# Patient Record
Sex: MALE | Race: WHITE | Employment: FULL TIME | URBAN - METROPOLITAN AREA
[De-identification: names, ages, dates, MRNs, and addresses within clinical notes are randomized per-mention and may not be internally consistent; named-entity substitution may affect disease eponyms.]

---

## 2023-07-27 ENCOUNTER — APPOINTMENT (OUTPATIENT)
Dept: GENERAL RADIOLOGY | Age: 23
End: 2023-07-27
Payer: COMMERCIAL

## 2023-07-27 ENCOUNTER — OFFICE VISIT (OUTPATIENT)
Age: 23
End: 2023-07-27

## 2023-07-27 ENCOUNTER — HOSPITAL ENCOUNTER (EMERGENCY)
Age: 23
Discharge: HOME | End: 2023-07-27
Attending: EMERGENCY MEDICINE
Payer: COMMERCIAL

## 2023-07-27 VITALS
TEMPERATURE: 97.8 F | DIASTOLIC BLOOD PRESSURE: 65 MMHG | HEIGHT: 61 IN | HEART RATE: 71 BPM | BODY MASS INDEX: 24.31 KG/M2 | WEIGHT: 128.75 LBS | RESPIRATION RATE: 14 BRPM | OXYGEN SATURATION: 100 % | SYSTOLIC BLOOD PRESSURE: 139 MMHG

## 2023-07-27 VITALS
SYSTOLIC BLOOD PRESSURE: 117 MMHG | DIASTOLIC BLOOD PRESSURE: 76 MMHG | OXYGEN SATURATION: 97 % | TEMPERATURE: 98.1 F | WEIGHT: 130 LBS | HEART RATE: 78 BPM

## 2023-07-27 DIAGNOSIS — S61.213A LACERATION OF LEFT MIDDLE FINGER WITHOUT FOREIGN BODY WITHOUT DAMAGE TO NAIL, INITIAL ENCOUNTER: ICD-10-CM

## 2023-07-27 DIAGNOSIS — S69.92XA HAND INJURY, LEFT, INITIAL ENCOUNTER: Primary | ICD-10-CM

## 2023-07-27 DIAGNOSIS — S61.211A LACERATION OF LEFT INDEX FINGER WITHOUT FOREIGN BODY WITHOUT DAMAGE TO NAIL, INITIAL ENCOUNTER: Primary | ICD-10-CM

## 2023-07-27 PROCEDURE — 90715 TDAP VACCINE 7 YRS/> IM: CPT | Performed by: EMERGENCY MEDICINE

## 2023-07-27 PROCEDURE — 90471 IMMUNIZATION ADMIN: CPT | Performed by: EMERGENCY MEDICINE

## 2023-07-27 PROCEDURE — 12041 INTMD RPR N-HF/GENIT 2.5CM/<: CPT

## 2023-07-27 PROCEDURE — 6360000002 HC RX W HCPCS: Performed by: EMERGENCY MEDICINE

## 2023-07-27 PROCEDURE — 73130 X-RAY EXAM OF HAND: CPT

## 2023-07-27 PROCEDURE — 6370000000 HC RX 637 (ALT 250 FOR IP): Performed by: EMERGENCY MEDICINE

## 2023-07-27 PROCEDURE — 99284 EMERGENCY DEPT VISIT MOD MDM: CPT

## 2023-07-27 RX ORDER — CEPHALEXIN 500 MG/1
500 CAPSULE ORAL 2 TIMES DAILY
Qty: 14 CAPSULE | Refills: 0 | Status: SHIPPED | OUTPATIENT
Start: 2023-07-27 | End: 2023-08-03

## 2023-07-27 RX ORDER — CEPHALEXIN 500 MG/1
500 CAPSULE ORAL ONCE
Status: COMPLETED | OUTPATIENT
Start: 2023-07-27 | End: 2023-07-27

## 2023-07-27 RX ADMIN — TETANUS TOXOID, REDUCED DIPHTHERIA TOXOID AND ACELLULAR PERTUSSIS VACCINE, ADSORBED 0.5 ML: 5; 2.5; 8; 8; 2.5 SUSPENSION INTRAMUSCULAR at 19:45

## 2023-07-27 RX ADMIN — CEPHALEXIN 500 MG: 500 CAPSULE ORAL at 19:45

## 2023-07-27 ASSESSMENT — LIFESTYLE VARIABLES
HOW OFTEN DO YOU HAVE A DRINK CONTAINING ALCOHOL: NEVER
HOW MANY STANDARD DRINKS CONTAINING ALCOHOL DO YOU HAVE ON A TYPICAL DAY: PATIENT DOES NOT DRINK

## 2023-07-27 ASSESSMENT — PAIN DESCRIPTION - ORIENTATION: ORIENTATION: LEFT

## 2023-07-27 ASSESSMENT — PAIN - FUNCTIONAL ASSESSMENT: PAIN_FUNCTIONAL_ASSESSMENT: PREVENTS OR INTERFERES WITH MANY ACTIVE NOT PASSIVE ACTIVITIES

## 2023-07-27 ASSESSMENT — ENCOUNTER SYMPTOMS
RESPIRATORY NEGATIVE: 1
EYES NEGATIVE: 1
COLOR CHANGE: 0
ALLERGIC/IMMUNOLOGIC NEGATIVE: 1
GASTROINTESTINAL NEGATIVE: 1

## 2023-07-27 ASSESSMENT — PAIN DESCRIPTION - LOCATION
LOCATION: HAND
LOCATION: FINGER (COMMENT WHICH ONE)

## 2023-07-27 ASSESSMENT — PAIN DESCRIPTION - DESCRIPTORS: DESCRIPTORS: THROBBING

## 2023-07-27 ASSESSMENT — PAIN SCALES - GENERAL
PAINLEVEL_OUTOF10: 7
PAINLEVEL_OUTOF10: 8

## 2023-07-27 ASSESSMENT — PAIN DESCRIPTION - PAIN TYPE: TYPE: ACUTE PAIN

## 2023-07-27 NOTE — PATIENT INSTRUCTIONS
The laceration on the left middle finger is very deep, with flexor tendon exposed. Please go directly to an ED where this can be appropriately managed with IV antibiotics.

## 2023-07-27 NOTE — ED TRIAGE NOTES
Work injury, hand caught in [employer]. Sustained laceration to index finger and middle finger at PIP joint. Bleeding controlled, wrapped with gauze. MD at bedside assessing. Triage obtained using  services. Tetanus needs updated.

## 2023-07-27 NOTE — PROGRESS NOTES
Talon Herrera (:  2000) is a 26 y.o. male,New patient, here for evaluation of the following chief complaint(s):  Hand Injury (Pt was at work, works with a machine and his hand got cut cleaning it. )      ASSESSMENT/PLAN:  Visit Diagnoses and Associated Orders       Hand injury, left, initial encounter    -  Primary                  Patient presents with multiple lacerations to the fingers of the left hand. Patient RHD. 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 Unsure of Tetanus status. Polish speaking. Initially presents with coworker who acts as  at registration. , Miriam Iverson, used for my history and exam.  Employer present at time of exam.     VSS. Exam as documented. Patient has what appears to be a superficial laceration on the pad of the left index finger. Minimal bleeding on exam.  Deep laceration with tendon exposure, though no visible tendon injury, on the left midded finger. ROM intact on exam.  Patient reports decreased sensation in the tips of the left index and middle finger on exam.  Following exam, I discussed with patient that laceration of left middle finger is very deep and requires higher level of care as he needs IV antibiotics due to exposure of underlying tendon. Advised patient I do not recommend delaying care to repair the left index finger laceration here. I have recommended patient go directly to the ED for further care and management. This is communicated through the  and patient verbalizes understanding. Patient's manager states she will drive him there. Prior to departure, left hand dressed with bulky 4x4 gauze and gauze wrap and tape. Patient tolerated bandaging well. Follow up to be determined following ED evaluation. SUBJECTIVE/OBJECTIVE:    History provided by:  Patient   used: Yes    Hand Injury        26 y.o. male presents with multiple lacerations of the fingers of hte left hand.   Patient injured at

## 2023-07-27 NOTE — ED PROVIDER NOTES
[address] [facility],[address] ENCOUNTER        Pt Name: Sachi Syed  MRN: [id number]  [address] [facility] 2000  Date of evaluation: 7/27/2023  Provider: Lauryn Camilo MD  PCP: No primary care provider on file. Note Started: 7:34 PM EDT 7/27/23    CHIEF COMPLAINT       Chief Complaint   Patient presents with    Hand Injury     Left hand laceration       HISTORY OF PRESENT ILLNESS: 1 or more Elements     History from : Patient and video     Limitations to history : Language Greenlandic,  used    Sachi Syed is a 26 y.o. male who presents for complex left hand laceration. 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 His index finger and middle finger on his left hand. Deep laceration. Controlled with pressure. Patient states he went to an urgent care who saw him and told he needed to come to an ER for further evaluation. Patient has no numbness normal sensation in his hand is able to move all his fingers with normal strength. Some mild tenderness to palpation. Patient states his tetanus is not up-to-date. No other injuries no chronic past medical history takes no medications no allergies. Nursing Notes were all reviewed and agreed with or any disagreements were addressed in the HPI. REVIEW OF SYSTEMS :      Review of Systems   Constitutional:  Negative for chills and fever. Musculoskeletal:  Negative for arthralgias and joint swelling. Skin:  Positive for wound. Negative for color change. Neurological:  Negative for weakness and numbness. Positives and Pertinent negatives as per HPI. SURGICAL HISTORY   History reviewed. No pertinent surgical history. CURRENTMEDICATIONS       Previous Medications    No medications on file       ALLERGIES     Patient has no known allergies. FAMILYHISTORY     History reviewed. No pertinent family history.      SOCIAL fingers, no tenderness to palpation of the joints no deformities no decreased cap refill   Skin:     General: Skin is warm and dry. Capillary Refill: Capillary refill takes less than 2 seconds. Neurological:      General: No focal deficit present. Mental Status: He is alert. Psychiatric:         Mood and Affect: Mood normal.       DIAGNOSTIC RESULTS   LABS:    Labs Reviewed - No data to display    When ordered only abnormal lab results are displayed. All other labs were within normal range or not returned as of this dictation. RADIOLOGY:   Non-plain film images such as CT, Ultrasound and MRI are read by the radiologist. Plain radiographic images are visualized and preliminarily interpreted by the ED Provider with the below findings:    No obvious foreign body or bony involvement noted. Interpreted by myself. Interpretation per the Radiologist below, if available at the time of this note:    XR HAND LEFT (MIN 3 VIEWS)   Final Result   No fracture           No results found. No results found.     PROCEDURES   Unless otherwise noted below, none     Lac Repair    Date/Time: 7/27/2023 9:46 PM  Performed by: Luis Lynch MD  Authorized by: Luis Lynch MD     Consent:     Consent obtained:  Verbal    Consent given by:  Patient    Risks, benefits, and alternatives were discussed: yes      Risks discussed:  Infection, need for additional repair, pain, poor cosmetic result, poor wound healing, retained foreign body, nerve damage, tendon damage and vascular damage    Alternatives discussed:  Observation  Universal protocol:     Procedure explained and questions answered to patient or proxy's satisfaction: yes      Test results available: yes      Imaging studies available: yes      Required blood products, implants, devices, and special equipment available: yes      Site/side marked: yes      Immediately prior to procedure, a time out was called: yes      Patient identity confirmed:  Verbally

## 2023-07-27 NOTE — ED NOTES
MD at bedside administering lidocaine to finger, unable to tolerate irrigation with out.       Lars Rivero RN  07/27/23 1950

## 2023-07-28 ENCOUNTER — TELEPHONE (OUTPATIENT)
Dept: ORTHOPEDIC SURGERY | Age: 23
End: 2023-07-28

## 2023-07-28 NOTE — ED NOTES
Pressure held to L 3rd finger wound x15 minutes with 4x4 gauze. Per MD, PSO applied to L 2nd and 3rd fingers, gauze placed, secured by coban wrap. Metal splints placed to 2nd and 3rd fingers, secured with coban wrap.       Andrés Osborn, EMT-P  07/27/23 [id number]

## 2023-08-09 ENCOUNTER — HOSPITAL ENCOUNTER (EMERGENCY)
Age: 23
Discharge: HOME | End: 2023-08-09
Attending: EMERGENCY MEDICINE
Payer: COMMERCIAL

## 2023-08-09 VITALS
RESPIRATION RATE: 20 BRPM | BODY MASS INDEX: 23.99 KG/M2 | DIASTOLIC BLOOD PRESSURE: 86 MMHG | TEMPERATURE: 98.7 F | OXYGEN SATURATION: 97 % | SYSTOLIC BLOOD PRESSURE: 126 MMHG | WEIGHT: 126.98 LBS | HEART RATE: 64 BPM

## 2023-08-09 DIAGNOSIS — Z48.02 VISIT FOR SUTURE REMOVAL: Primary | ICD-10-CM

## 2023-08-09 PROCEDURE — 99282 EMERGENCY DEPT VISIT SF MDM: CPT

## 2023-08-09 ASSESSMENT — PAIN - FUNCTIONAL ASSESSMENT: PAIN_FUNCTIONAL_ASSESSMENT: 0-10

## 2023-08-09 ASSESSMENT — PAIN SCALES - GENERAL: PAINLEVEL_OUTOF10: 8

## 2023-08-09 NOTE — ED TRIAGE NOTES
Patient's preferred language is [place] and [facility] [place]. [facility] remote interpretor service utilized for triage. (Interpretor ID:  #[id number] )     Patient presents to ED requesting suture removal. Sutures noted to left index and middle finger. Patient reports ongoing pain 8/10 to middle finger with palpation, denies other complications. Sutured laceration noted to left index and middle fingers. No obvious signs of infection. Dried blood noted to site. Patient resting on bed, respirations even and easy at this time. No obvious distress.     MD at bedside during RN triage for suture removal.